# Patient Record
Sex: MALE | Race: WHITE | URBAN - METROPOLITAN AREA
[De-identification: names, ages, dates, MRNs, and addresses within clinical notes are randomized per-mention and may not be internally consistent; named-entity substitution may affect disease eponyms.]

---

## 2017-02-13 ENCOUNTER — TELEPHONE (OUTPATIENT)
Dept: NURSING | Facility: CLINIC | Age: 27
End: 2017-02-13

## 2017-02-14 NOTE — TELEPHONE ENCOUNTER
Call Type: Triage Call    Presenting Problem: Brian calling to obtain information specific to  HPV vaccination received in clinic.  Only specific information noted  related to vaccination, is  and Lot and NDC #.  Per  pharmacy, different strains are strain 4 or 9.  Able to determine  with NDC# per pharmacist.  Triage Note:  Guideline Title: Information Only Call; No Symptom Triage (Adult)  Recommended Disposition: Provide Information or Advice Only  Original Inclination:  Override Disposition:  Intended Action:  Physician Contacted: No  Follow-up call to recent contact; no triage required. Information provided from  past call documentation, approved references or experience. ?  YES  Requesting regular office appointment ? NO  Sign(s) or symptom(s) associated with a diagnosed condition or with a new illness  ? NO  Requesting information about provider, services or community resources ? NO  Call back to complete assessment/clarification of information from prior caller to  complete triage ? NO  Requesting information and provider is best resource; no triage required. ? NO  Caller not with patient and is unable to provide clinical information about  patient to facilitate triage. ? NO  Requesting provider information for recently scheduled test, procedure; no triage  required. Needed information not available per approved resources or clinical  experience. ? NO  Requesting information not available per approved reference or clinical  experience; no triage required. ? NO  Requesting information regarding scheduled exam, test or procedure; no triage  required. Information provided from approved resources or clinical experience. ?  NO  General information question; no triage required. Information provided from  approved references or knowledge of organization. ? NO  Health information question; person denies any symptoms, no triage required.  Information provided from approved references or clinical experience.  ? NO  Physician Instructions:  Care Advice:

## 2017-02-16 ENCOUNTER — TELEPHONE (OUTPATIENT)
Dept: PEDIATRICS | Facility: CLINIC | Age: 27
End: 2017-02-16

## 2017-02-16 NOTE — TELEPHONE ENCOUNTER
Jose Juan CABALLERO at 12:47 pm that pt is requesting to get his 2nd HPV there & he is 10 days early. Wants to know whether we approve early administration of 2nd HPV? If so, need to fax order for HPV vaccine because of its early. Call back # [phone].     Pt received his 1st HPV on 12/26/16. So, 2nd will be due on or after 02/26/17. Called pharmacy back & notified that he need to wait for 10 more days to receive his 2nd dose. No need to administer early.     Norma, RN  Triage Nurse

## 2017-03-03 ENCOUNTER — TELEPHONE (OUTPATIENT)
Dept: NURSING | Facility: CLINIC | Age: 27
End: 2017-03-03

## 2017-03-04 NOTE — TELEPHONE ENCOUNTER
Call Type: Triage Call    Presenting Problem: Caller is requesting the NDC# of the HPV vaccine  he received on 12/26/2016 as he is receiving  second dose  at  [facility]. Information  located in EPIC EMR and shared with  patient.  Triage Note:  Guideline Title: Information Only Call; No Symptom Triage (Adult)  Recommended Disposition: Provide Information or Advice Only  Original Inclination:  Override Disposition:  Intended Action:  Physician Contacted: No  Requesting information regarding scheduled exam, test or procedure; no triage  required. Information provided from approved resources or clinical experience. ?  YES  Requesting regular office appointment ? NO  Sign(s) or symptom(s) associated with a diagnosed condition or with a new illness  ? NO  Requesting information about provider, services or community resources ? NO  Call back to complete assessment/clarification of information from prior caller to  complete triage ? NO  Requesting information and provider is best resource; no triage required. ? NO  Caller not with patient and is unable to provide clinical information about  patient to facilitate triage. ? NO  Requesting provider information for recently scheduled test, procedure; no triage  required. Needed information not available per approved resources or clinical  experience. ? NO  Requesting information not available per approved reference or clinical  experience; no triage required. ? NO  Physician Instructions:  Care Advice:

## 2017-03-28 ENCOUNTER — MYC REFILL (OUTPATIENT)
Dept: PEDIATRICS | Facility: CLINIC | Age: 27
End: 2017-03-28

## 2017-03-28 ENCOUNTER — TELEPHONE (OUTPATIENT)
Dept: PEDIATRICS | Facility: CLINIC | Age: 27
End: 2017-03-28

## 2017-03-28 DIAGNOSIS — L70.0 COMEDONE: ICD-10-CM

## 2017-03-28 RX ORDER — TRETINOIN 0.5 MG/G
CREAM TOPICAL
Qty: 45 G | Refills: 3 | Status: SHIPPED | OUTPATIENT
Start: 2017-03-28 | End: 2017-07-20

## 2017-03-28 RX ORDER — TRETINOIN 0.5 MG/G
CREAM TOPICAL
Qty: 45 G | Refills: 11 | Status: CANCELLED | OUTPATIENT
Start: 2017-03-28

## 2017-03-28 NOTE — TELEPHONE ENCOUNTER
PA requested for tretinoin, submitted via covermymeds, await decision.    Brian Greer (Key: [id number]) - [id number]  Tretinoin 0.05% cream  Status: Sent to Plan  Created: March 28th, 2017 [phone]  Sent: March 28th, 2017      Dalila Barry MA

## 2017-03-28 NOTE — TELEPHONE ENCOUNTER
tretinoin      Last Written Prescription Date: 3/17/16  Last Fill Quantity: 45,  # refills: 11   Last Office Visit with [facility], [facility] or [facility] prescribing provider: 7/14/16    Prescription approved per [facility] Refill Protocol.  Zayra Mcintyre RN  Message handled by Nurse Triage.

## 2017-03-28 NOTE — TELEPHONE ENCOUNTER
Called and talked to pt regarding refill request of tretinoin.  Pt has not seen Dr Thomas since 9/15/2015. Last refill from Dr Thomas was 8/5/15. Pt has had refill of tretinoin with Dr Coronel 3/17/16.   Informed pt of yearly follow up with Dr Thomas for refill and pt is unable to schedule [place].   Pt states he will attempt to contact Dr Coronel for refill.  Also instructed to establish care in his new residential state for further care.   No further questions or concerns.  Reanna DENG RN  [facility]

## 2017-03-28 NOTE — TELEPHONE ENCOUNTER
Message from [employer]:  Original authorizing provider: Santiago Coronel MD, MD Alexander Greer would like a refill of the following medications:  tretinoin (RETIN-A) 0.05 % cream [Santiago Coronel MD, MD]    Preferred pharmacy: [facility] -- [address], [place]    Comment:

## 2017-03-31 NOTE — TELEPHONE ENCOUNTER
PA form was incorrect, Resubmitted today via covermymeds. Await decision again.      Brian Greer (Key: [id number]) - [id number]  Tretinoin 0.05% cream  Status: PA Request  Created: March 28th, 2017 [phone]  Sent: March 31st, 2017      Dalila Barry MA

## 2017-07-11 ENCOUNTER — OFFICE VISIT (OUTPATIENT)
Dept: PEDIATRICS | Facility: CLINIC | Age: 27
End: 2017-07-11
Payer: COMMERCIAL

## 2017-07-11 VITALS
WEIGHT: 147 LBS | HEIGHT: 71 IN | BODY MASS INDEX: 20.58 KG/M2 | HEART RATE: 74 BPM | TEMPERATURE: 98.8 F | OXYGEN SATURATION: 98 % | DIASTOLIC BLOOD PRESSURE: 60 MMHG | SYSTOLIC BLOOD PRESSURE: 100 MMHG

## 2017-07-11 DIAGNOSIS — J02.0 STREPTOCOCCAL PHARYNGITIS: Primary | ICD-10-CM

## 2017-07-11 DIAGNOSIS — Z23 NEED FOR HPV VACCINE: ICD-10-CM

## 2017-07-11 LAB
DEPRECATED S PYO AG THROAT QL EIA: ABNORMAL
MICRO REPORT STATUS: ABNORMAL
SPECIMEN SOURCE: ABNORMAL

## 2017-07-11 PROCEDURE — 87880 STREP A ASSAY W/OPTIC: CPT | Performed by: INTERNAL MEDICINE

## 2017-07-11 PROCEDURE — 90651 9VHPV VACCINE 2/3 DOSE IM: CPT | Performed by: INTERNAL MEDICINE

## 2017-07-11 PROCEDURE — 99213 OFFICE O/P EST LOW 20 MIN: CPT | Performed by: INTERNAL MEDICINE

## 2017-07-11 RX ORDER — AMOXICILLIN 500 MG/1
500 CAPSULE ORAL 2 TIMES DAILY
Qty: 20 CAPSULE | Refills: 0 | Status: SHIPPED | OUTPATIENT
Start: 2017-07-11 | End: 2017-07-21

## 2017-07-11 NOTE — PROGRESS NOTES
SUBJECTIVE:                                                    Brian Greer is a 26 year old male who presents to clinic today for the following health issues:    Acute Illness   Acute illness concerns: sore throat  Onset: 4 days    Fever: no    Chills/Sweats: YES    Headache (location?): YES    Sinus Pressure:chronic sinus congestion    Conjunctivitis:  no    Ear Pain: no    Rhinorrhea: no    Congestion: YES    Sore Throat: YES     Cough: no    Wheeze: no    Decreased Appetite: no    Nausea: no    Vomiting: no    Diarrhea:  no    Dysuria/Freq.: no    Fatigue/Achiness: YES    Sick/Strep Exposure: no     Therapies Tried and outcome:       Patient Active Problem List   Diagnosis     CARDIOVASCULAR SCREENING; LDL GOAL LESS THAN 160     Acne     Chronic rhinitis     Deviated nasal septum     Past Surgical History:   Procedure Laterality Date     C NONSPECIFIC PROCEDURE  02/00    PETs     C NONSPECIFIC PROCEDURE      intusseption age 4 and age 6       Social History   Substance Use Topics     Smoking status: Never Smoker     Smokeless tobacco: Never Used      Comment: non smoking home     Alcohol use No     Family History   Problem Relation Age of Onset     C.A.D. Maternal Grandfather      DIABETES No family hx of      Cancer - colorectal No family hx of          Current Outpatient Prescriptions   Medication Sig Dispense Refill     amoxicillin (AMOXIL) 500 MG capsule Take 1 capsule (500 mg) by mouth 2 times daily for 10 days 20 capsule 0     tretinoin (RETIN-A) 0.05 % cream Spread a pea size amount into affected area topically at bedtime.  Use sunscreen SPF>20. 45 g 3     finasteride (PROPECIA) 1 MG tablet Take 1 tablet (1 mg) by mouth daily 90 tablet 3       ROS: The following systems have been completely reviewed and are negative except as noted in the HPI: CONSTITUTIONAL, HEAD AND NECK, PULMONARY    OBJECTIVE:                                                    /60 (Cuff Size: Adult Regular)  Pulse 74   "Temp 98.8  F (37.1  C) (Oral)  Ht 5' 11\" (1.803 m)  Wt 147 lb (66.7 kg)  SpO2 98%  BMI 20.5 kg/m2 Body mass index is 20.5 kg/(m^2).  GENERAL:  alert,  no distress  HENT: ear canals- normal; TMs- normal; oropharynx- erythematous with small amounts of white exudate  NECK: no tenderness, no adenopathy  RESP: lungs clear to auscultation - no rales, no rhonchi, no wheezes  CV: regular rates and rhythm, normal S1 S2  MS: extremities- no edema     Results for orders placed or performed in visit on 07/11/17   Strep, Rapid Screen   Result Value Ref Range    Specimen Description Throat     Rapid Strep A Screen (A)      POSITIVE: Group A Streptococcal antigen detected by immunoassay.    Micro Report Status FINAL 07/11/2017         ASSESSMENT/PLAN:                                                        ICD-10-CM    1. Streptococcal pharyngitis J02.0 Strep, Rapid Screen     amoxicillin (AMOXIL) 500 MG capsule     Strep throat, 10 days amoxicillin     2. Need for HPV vaccine Z23 HUMAN PAPILLOMA VIRUS (GARDASIL 9) VACCINE           Gives additional history of recurrent sore throat while living in [place]. Typically goes to urgent care and is prescribed antibiotics m  (however strep test often negative). With ongoing nasal congestion and postnasal drip-this may account for periodic sore throat.   Recommended patient resume antihistamine combined with nasal steroid to reduce postnasal drainage --if recurrent sore throat symptoms persist instructed to follow-up with ENT and possibly an allergist for additional testing    Brian Chairez MD  [facility] [place]     "

## 2017-07-11 NOTE — MR AVS SNAPSHOT
After Visit Summary   7/11/2017    Brian Greer    MRN: [id number]           Patient Information     Date Of Birth          1990        Visit Information        Provider Department      7/11/2017 4:20 PM Brian Chairez MD [facility]        Today's Diagnoses     Streptococcal pharyngitis    -  1    Throat pain          Care Instructions    INSTRUCTIONS FOR TODAY:     amoxicillin for 10 days   push fluids   if recurrent strep infections or tonsillitis (ex 4-5 a year) would recommend ENT visit to consider tonsillectomy     after antibiotics completed, would recommend resuming nasal steroid and claritin for 3-4 weeks--suspect ongoing post nasal drip may account for intermittent sore throat    Dr Chairez            Follow-ups after your visit        Your next 10 appointments already scheduled     Jul 20, 2017  1:50 PM CDT   PHYSICAL with Santiago Coronel MD   [facility]    [address] [place] [address]  [address]  [facility] [address] [phone]              Who to contact     If you have questions or need follow up information about today's clinic visit or your schedule please contact [facility] directly at [phone].  Normal or non-critical lab and imaging results will be communicated to you by MyChart, letter or phone within 4 business days after the clinic has received the results. If you do not hear from us within 7 days, please contact the clinic through [employer] In[employer] or phone. If you have a critical or abnormal lab result, we will notify you by phone as soon as possible.  Submit refill requests through [employer] or call your pharmacy and they will forward the refill request to us. Please allow 3 business days for your refill to be completed.          Additional Information About Your Visit        MyChart Information     [employer] gives you secure access to your electronic health record. If you see a primary care provider, you  "can also send messages to your care team and make appointments. If you have questions, please call your primary care clinic.  If you do not have a primary care provider, please call [phone] and they will assist you.        Care EveryWhere ID     This is your Care EveryWhere ID. This could be used by other organizations to access your [facility] medical records  [id number]        Your Vitals Were     Pulse Temperature Height Pulse Oximetry BMI (Body Mass Index)       74 98.8  F (37.1  C) (Oral) 5' 11\" (1.803 m) 98% 20.5 kg/m2        Blood Pressure from Last 3 Encounters:   07/11/17 100/60   12/26/16 122/82   07/14/16 102/60    Weight from Last 3 Encounters:   07/11/17 147 lb (66.7 kg)   12/26/16 140 lb 3.2 oz (63.6 kg)   07/14/16 154 lb (69.9 kg)              We Performed the Following     Strep, Rapid Screen          Today's Medication Changes          These changes are accurate as of: 7/11/17  5:02 PM.  If you have any questions, ask your nurse or doctor.               Start taking these medicines.        Dose/Directions    amoxicillin 500 MG capsule   Commonly known as:  AMOXIL   Used for:  Streptococcal pharyngitis   Started by:  Brian Chairez MD        Dose:  500 mg   Take 1 capsule (500 mg) by mouth 2 times daily for 10 days   Quantity:  20 capsule   Refills:  0         Stop taking these medicines if you haven't already. Please contact your care team if you have questions.     cefdinir 300 MG capsule   Commonly known as:  OMNICEF   Stopped by:  Brian Chairez MD                Where to get your medicines      These medications were sent to [facility] - [address]   [address] [place] [address]     Phone:  [phone]     amoxicillin 500 MG capsule                Primary Care Provider Office Phone # Fax #    Brian Chairez [phone]       [facility] [address] [place] " [id number]        Equal Access to Services     : Hadii aad ku hadwilliambrooklyn Rachelali, wageraldda luqjudahha, qatyshawn michelleelvadirk lerner, tiny hernandez. So [facility] [phone].    ATENCIÓN: Si habla español, tiene a garcia disposición servicios gratuitos de asistencia lingüística. Llame al [phone].    We comply with applicable federal civil rights laws and [place] laws. We do not discriminate on the basis of race, color, national origin, age, disability sex, sexual orientation or gender identity.            Thank you!     Thank you for choosing [facility] [place]  for your care. Our goal is always to provide you with excellent care. Hearing back from our patients is one way we can continue to improve our services. Please take a few minutes to complete the written survey that you may receive in the mail after your visit with us. Thank you!             Your Updated Medication List - Protect others around you: Learn how to safely use, store and throw away your medicines at www.disposemymeds.org.          This list is accurate as of: 7/11/17  5:02 PM.  Always use your most recent med list.                   Brand Name Dispense Instructions for use Diagnosis    amoxicillin 500 MG capsule    AMOXIL    20 capsule    Take 1 capsule (500 mg) by mouth 2 times daily for 10 days    Streptococcal pharyngitis       finasteride 1 MG tablet    PROPECIA    90 tablet    Take 1 tablet (1 mg) by mouth daily    Alopecia, male pattern       tretinoin 0.05 % cream    RETIN-A    45 g    Spread a pea size amount into affected area topically at bedtime.  Use sunscreen SPF>20.    [facility]

## 2017-07-11 NOTE — NURSING NOTE
"Chief Complaint   Patient presents with     URI       Initial /60 (Cuff Size: Adult Regular)  Pulse 74  Temp 98.8  F (37.1  C) (Oral)  Ht 5' 11\" (1.803 m)  Wt 147 lb (66.7 kg)  SpO2 98%  BMI 20.5 kg/m2 Estimated body mass index is 20.5 kg/(m^2) as calculated from the following:    Height as of this encounter: 5' 11\" (1.803 m).    Weight as of this encounter: 147 lb (66.7 kg).  Medication Reconciliation: jamari Bautista CMA    "

## 2017-07-20 ENCOUNTER — OFFICE VISIT (OUTPATIENT)
Dept: PEDIATRICS | Facility: CLINIC | Age: 27
End: 2017-07-20
Payer: COMMERCIAL

## 2017-07-20 VITALS
DIASTOLIC BLOOD PRESSURE: 62 MMHG | TEMPERATURE: 98.4 F | SYSTOLIC BLOOD PRESSURE: 102 MMHG | WEIGHT: 149 LBS | HEIGHT: 71 IN | HEART RATE: 65 BPM | OXYGEN SATURATION: 99 % | BODY MASS INDEX: 20.86 KG/M2

## 2017-07-20 DIAGNOSIS — L91.8 SKIN TAG: ICD-10-CM

## 2017-07-20 DIAGNOSIS — L70.0 COMEDONE: ICD-10-CM

## 2017-07-20 DIAGNOSIS — R07.0 THROAT PAIN: ICD-10-CM

## 2017-07-20 DIAGNOSIS — Z00.00 ROUTINE GENERAL MEDICAL EXAMINATION AT A HEALTH CARE FACILITY: Primary | ICD-10-CM

## 2017-07-20 LAB
BASOPHILS # BLD AUTO: 0 10E9/L (ref 0–0.2)
BASOPHILS NFR BLD AUTO: 0.4 %
DIFFERENTIAL METHOD BLD: NORMAL
EOSINOPHIL # BLD AUTO: 0.2 10E9/L (ref 0–0.7)
EOSINOPHIL NFR BLD AUTO: 3.1 %
ERYTHROCYTE [DISTWIDTH] IN BLOOD BY AUTOMATED COUNT: 12.6 % (ref 10–15)
HCT VFR BLD AUTO: 44.7 % (ref 40–53)
HGB BLD-MCNC: 14.6 G/DL (ref 13.3–17.7)
LYMPHOCYTES # BLD AUTO: 2.3 10E9/L (ref 0.8–5.3)
LYMPHOCYTES NFR BLD AUTO: 34.7 %
MCH RBC QN AUTO: 30.5 PG (ref 26.5–33)
MCHC RBC AUTO-ENTMCNC: 32.7 G/DL (ref 31.5–36.5)
MCV RBC AUTO: 93 FL (ref 78–100)
MONOCYTES # BLD AUTO: 0.7 10E9/L (ref 0–1.3)
MONOCYTES NFR BLD AUTO: 10.4 %
NEUTROPHILS # BLD AUTO: 3.5 10E9/L (ref 1.6–8.3)
NEUTROPHILS NFR BLD AUTO: 51.4 %
PLATELET # BLD AUTO: 223 10E9/L (ref 150–450)
RBC # BLD AUTO: 4.79 10E12/L (ref 4.4–5.9)
WBC # BLD AUTO: 6.8 10E9/L (ref 4–11)

## 2017-07-20 PROCEDURE — 87491 CHLMYD TRACH DNA AMP PROBE: CPT | Performed by: INTERNAL MEDICINE

## 2017-07-20 PROCEDURE — 99395 PREV VISIT EST AGE 18-39: CPT | Mod: 25 | Performed by: INTERNAL MEDICINE

## 2017-07-20 PROCEDURE — 36415 COLL VENOUS BLD VENIPUNCTURE: CPT | Performed by: INTERNAL MEDICINE

## 2017-07-20 PROCEDURE — 86706 HEP B SURFACE ANTIBODY: CPT | Performed by: INTERNAL MEDICINE

## 2017-07-20 PROCEDURE — 87591 N.GONORRHOEAE DNA AMP PROB: CPT | Performed by: INTERNAL MEDICINE

## 2017-07-20 PROCEDURE — 87389 HIV-1 AG W/HIV-1&-2 AB AG IA: CPT | Performed by: INTERNAL MEDICINE

## 2017-07-20 PROCEDURE — 86695 HERPES SIMPLEX TYPE 1 TEST: CPT | Performed by: INTERNAL MEDICINE

## 2017-07-20 PROCEDURE — 17110 DESTRUCTION B9 LES UP TO 14: CPT | Performed by: INTERNAL MEDICINE

## 2017-07-20 PROCEDURE — 87340 HEPATITIS B SURFACE AG IA: CPT | Performed by: INTERNAL MEDICINE

## 2017-07-20 PROCEDURE — 85025 COMPLETE CBC W/AUTO DIFF WBC: CPT | Performed by: INTERNAL MEDICINE

## 2017-07-20 PROCEDURE — 86696 HERPES SIMPLEX TYPE 2 TEST: CPT | Performed by: INTERNAL MEDICINE

## 2017-07-20 PROCEDURE — 86780 TREPONEMA PALLIDUM: CPT | Performed by: INTERNAL MEDICINE

## 2017-07-20 RX ORDER — TRETINOIN 0.5 MG/G
CREAM TOPICAL
Qty: 45 G | Refills: 3 | Status: SHIPPED | OUTPATIENT
Start: 2017-07-20

## 2017-07-20 NOTE — PROGRESS NOTES
SUBJECTIVE:   CC: Brian Greer is an 27 year old male who presents for preventative health visit.     Physical   Annual:     Getting at least 3 servings of Calcium per day::  NO    Bi-annual eye exam::  NO    Dental care twice a year::  Yes    Sleep apnea or symptoms of sleep apnea::  Daytime drowsiness    Diet::  Other    Frequency of exercise::  1 day/week    Duration of exercise::  30-45 minutes    Taking medications regularly::  Yes    Medication side effects::  Other    Additional concerns today::  YES    Sore throats since moving - has been on antibiotics several times in the last several years. Has post nasal drip, feels may be making worse. Is to start claritin and flonase.     Itchy area on bottom, no bleeding, not changing in size at all. Would like removed if possible.         Today's PHQ-2 Score:   PHQ-2 ( 1999 Pfizer) 7/14/2016   Q1: Little interest or pleasure in doing things 0   Q2: Feeling down, depressed or hopeless 0   PHQ-2 Score 0       Abuse: Current or Past(Physical, Sexual or Emotional)- No  Do you feel safe in your environment - Yes    Social History   Substance Use Topics     Smoking status: Never Smoker     Smokeless tobacco: Never Used      Comment: non smoking home     Alcohol use No     The patient does not drink >3 drinks per day nor >7 drinks per week.    Last PSA: No results found for: PSA    Reviewed orders with patient. Reviewed health maintenance and updated orders accordingly - Yes  Labs reviewed in EPIC    Reviewed and updated as needed this visit by clinical staff         Reviewed and updated as needed this visit by Provider              ROS:  C: NEGATIVE for fever, chills, change in weight  I: NEGATIVE for worrisome rashes, moles or lesions  E: NEGATIVE for vision changes or irritation  ENT: NEGATIVE for ear, mouth and throat problems  R: NEGATIVE for significant cough or SOB  CV: NEGATIVE for chest pain, palpitations or peripheral edema  GI: NEGATIVE for nausea,  "abdominal pain, heartburn, or change in bowel habits   male: negative for dysuria, hematuria, decreased urinary stream, erectile dysfunction, urethral discharge  M: NEGATIVE for significant arthralgias or myalgia  N: NEGATIVE for weakness, dizziness or paresthesias  P: NEGATIVE for changes in mood or affect    OBJECTIVE:   /62 (BP Location: Right arm, Cuff Size: Adult Regular)  Pulse 65  Temp 98.4  F (36.9  C) (Oral)  Ht 5' 11\" (1.803 m)  Wt 149 lb (67.6 kg)  SpO2 99%  BMI 20.78 kg/m2    EXAM:  GENERAL: healthy, alert and no distress  EYES: Eyes grossly normal to inspection, PERRL and conjunctivae and sclerae normal  HENT: ear canals and TM's normal, nose and mouth without ulcers or lesions  NECK: no adenopathy, no asymmetry, masses, or scars and thyroid normal to palpation  RESP: lungs clear to auscultation - no rales, rhonchi or wheezes  CV: regular rate and rhythm, normal S1 S2, no S3 or S4, no murmur, click or rub, no peripheral edema and peripheral pulses strong  ABDOMEN: soft, nontender, no hepatosplenomegaly, no masses and bowel sounds normal  MS: no gross musculoskeletal defects noted, no edema  SKIN: no suspicious lesions or rashes  SKIN: noted 0.25 cm area pedunculated on left mid buttock area near the gluteal cleft  NEURO: Normal strength and tone, mentation intact and speech normal  PSYCH: mentation appears normal, affect normal/bright  LYMPH: no cervical, supraclavicular, axillary, or inguinal adenopathy  : normal external genitalia, normal testicles without masses    Procedure: verbal consent obtained, pedunculated skin tag treated with 4 cycles of liquid nitrogen cryotherapy, patient tolerated well without complicatons    ASSESSMENT/PLAN:   1. Routine general medical examination at a health care facility  Discussed routine health screening, interested in starting PrEP therapy. Will get baseline labs for this.  - HIV Antigen Antibody Combo  - Anti Treponema  - Herpes Simplex Virus 1 and " "2 IgG  - NEISSERIA GONORRHOEA PCR  - CHLAMYDIA TRACHOMATIS PCR  - NEISSERIA GONORRHOEA PCR  - CHLAMYDIA TRACHOMATIS PCR  - NEISSERIA GONORRHOEA PCR  - CHLAMYDIA TRACHOMATIS PCR  - Hepatitis B Surface Antibody  - Hepatitis B surface antigen    2. Comedone  Will continue current therapy  - tretinoin (RETIN-A) 0.05 % cream; Spread a pea size amount into affected area topically at bedtime.  Use sunscreen SPF>20.  Dispense: 45 g; Refill: 3    3. Skin tag  Treated with liquid nitrogen, given options of shave removal versus cryotherapy,   - DESTRUCT BENIGN LESION, UP TO 14    4. Throat pain  Will check white count, agree with claritin and flonase, STI testing as ntoed above  - CBC with platelets differential    COUNSELING:   Reviewed preventive health counseling, as reflected in patient instructions         reports that he has never smoked. He has never used smokeless tobacco.      Estimated body mass index is 20.5 kg/(m^2) as calculated from the following:    Height as of 7/11/17: 5' 11\" (1.803 m).    Weight as of 7/11/17: 147 lb (66.7 kg).         Counseling Resources:  ATP IV Guidelines  Pooled Cohorts Equation Calculator  FRAX Risk Assessment  ICSI Preventive Guidelines  Dietary Guidelines for Americans, 2010  USDA's MyPlate  ASA Prophylaxis  Lung CA Screening    Santiago Coronel MD, MD  [facility] [place]  "

## 2017-07-20 NOTE — MR AVS SNAPSHOT
After Visit Summary   7/20/2017    Brian Greer    MRN: [id number]           Patient Information     Date Of Birth          1990        Visit Information        Provider Department      7/20/2017 1:50 PM Santiago Coronel MD [facility]        Today's Diagnoses     Routine general medical examination at a health care facility    -  1    [place]        Skin tag        Throat pain          Care Instructions    1) Skin tag treated with liquid nitrogen, should come off in 4-5 days, can use bandaid with topical antibiotic if needed    2) Routine screening labs as we discussed- blood and urine downstairs.     3) I will let you know lab results when I get them back    4) Refilled the topical acne medication    Santiago Coronel MD    Preventive Health Recommendations  Male Ages 26 - 39    Yearly exam:             See your health care provider every year in order to  o   Review health changes.   o   Discuss preventive care.    o   Review your medicines if your doctor has prescribed any.    You should be tested each year for STDs (sexually transmitted diseases), if you re at risk.     After age 35, talk to your provider about cholesterol testing. If you are at risk for heart disease, have your cholesterol tested at least every 5 years.     If you are at risk for diabetes, you should have a diabetes test (fasting glucose).  Shots: Get a flu shot each year. Get a tetanus shot every 10 years.     Nutrition:    Eat at least 5 servings of fruits and vegetables daily.     Eat whole-grain bread, whole-wheat pasta and brown rice instead of white grains and rice.     Talk to your provider about Calcium and Vitamin D.     Lifestyle    Exercise for at least 150 minutes a week (30 minutes a day, 5 days a week). This will help you control your weight and prevent disease.     Limit alcohol to one drink per day.     No smoking.     Wear sunscreen to prevent skin cancer.     See your dentist every six months  "for an exam and cleaning.             Follow-ups after your visit        Who to contact     If you have questions or need follow up information about today's clinic visit or your schedule please contact [facility] directly at [phone].  Normal or non-critical lab and imaging results will be communicated to you by MyChart, letter or phone within 4 business days after the clinic has received the results. If you do not hear from us within 7 days, please contact the clinic through [employer] or phone. If you have a critical or abnormal lab result, we will notify you by phone as soon as possible.  Submit refill requests through [employer] or call your pharmacy and they will forward the refill request to us. Please allow 3 business days for your refill to be completed.          Additional Information About Your Visit        [employer] Information     [employer] gives you secure access to your electronic health record. If you see a primary care provider, you can also send messages to your care team and make appointments. If you have questions, please call your primary care clinic.  If you do not have a primary care provider, please call [phone] and they will assist you.        Care EveryWhere ID     This is your Care EveryWhere ID. This could be used by other organizations to access your [facility] medical records  [id number]        Your Vitals Were     Pulse Temperature Height Pulse Oximetry BMI (Body Mass Index)       65 98.4  F (36.9  C) (Oral) 5' 11\" (1.803 m) 99% 20.78 kg/m2        Blood Pressure from Last 3 Encounters:   07/20/17 102/62   07/11/17 100/60   12/26/16 122/82    Weight from Last 3 Encounters:   07/20/17 149 lb (67.6 kg)   07/11/17 147 lb (66.7 kg)   12/26/16 140 lb 3.2 oz (63.6 kg)              We Performed the Following     Anti Treponema     CBC with platelets differential     CHLAMYDIA TRACHOMATIS PCR     CHLAMYDIA TRACHOMATIS PCR     CHLAMYDIA TRACHOMATIS PCR     Hepatitis B Surface Antibody "     Hepatitis B surface antigen     Herpes Simplex Virus 1 and 2 IgG     HIV Antigen Antibody Combo     NEISSERIA GONORRHOEA PCR     NEISSERIA GONORRHOEA PCR     NEISSERIA GONORRHOEA PCR          Where to get your medicines      These medications were sent to [facility] [place] [facility] [place] - [address]   [address], [place] [address]     Phone:  [phone]     tretinoin 0.05 % cream          Primary Care Provider Office Phone # Fax #    Brian Chairez [phone]       [facility] [address] [place] [address]        Equal Access to Services     [facility]: Hadii aad ku hadasho Soomaali, waaxda luqadaha, qaybta kaalmada adeegyada, waxay idiin hayaan rupert fernando . So [facility] [phone].    ATENCIÓN: Si habla español, tiene a garcia disposición servicios gratuitos de asistencia lingüística. [facility] [phone].    We comply with applicable federal civil rights laws and [place] laws. We do not discriminate on the basis of race, color, national origin, age, disability sex, sexual orientation or gender identity.            Thank you!     Thank you for choosing [facility]  for your care. Our goal is always to provide you with excellent care. Hearing back from our patients is one way we can continue to improve our services. Please take a few minutes to complete the written survey that you may receive in the mail after your visit with us. Thank you!             Your Updated Medication List - Protect others around you: Learn how to safely use, store and throw away your medicines at www.disposemymeds.org.          This list is accurate as of: 7/20/17  2:38 PM.  Always use your most recent med list.                   Brand Name Dispense Instructions for use Diagnosis    amoxicillin 500 MG capsule    AMOXIL    20 capsule    Take 1 capsule (500 mg) by mouth 2 times daily for 10 days    Streptococcal pharyngitis        CLARITIN PO           finasteride 1 MG tablet    PROPECIA    90 tablet    Take 1 tablet (1 mg) by mouth daily    Alopecia, male pattern       FLONASE NA           tretinoin 0.05 % cream    RETIN-A    45 g    Spread a pea size amount into affected area topically at bedtime.  Use sunscreen SPF>20.    [place]

## 2017-07-20 NOTE — NURSING NOTE
"Chief Complaint   Patient presents with     Physical       Initial /62 (BP Location: Right arm, Cuff Size: Adult Regular)  Pulse 65  Temp 98.4  F (36.9  C) (Oral)  Ht 5' 11\" (1.803 m)  Wt 149 lb (67.6 kg)  SpO2 99%  BMI 20.78 kg/m2 Estimated body mass index is 20.78 kg/(m^2) as calculated from the following:    Height as of this encounter: 5' 11\" (1.803 m).    Weight as of this encounter: 149 lb (67.6 kg).  Medication Reconciliation: complete   Dalila Barry MA    "

## 2017-07-20 NOTE — PATIENT INSTRUCTIONS
1) Skin tag treated with liquid nitrogen, should come off in 4-5 days, can use bandaid with topical antibiotic if needed    2) Routine screening labs as we discussed- blood and urine downstairs.     3) I will let you know lab results when I get them back    4) Refilled the topical acne medication    Santiago Coronel MD    Preventive Health Recommendations  Male Ages 26 - 39    Yearly exam:             See your health care provider every year in order to  o   Review health changes.   o   Discuss preventive care.    o   Review your medicines if your doctor has prescribed any.    You should be tested each year for STDs (sexually transmitted diseases), if you re at risk.     After age 35, talk to your provider about cholesterol testing. If you are at risk for heart disease, have your cholesterol tested at least every 5 years.     If you are at risk for diabetes, you should have a diabetes test (fasting glucose).  Shots: Get a flu shot each year. Get a tetanus shot every 10 years.     Nutrition:    Eat at least 5 servings of fruits and vegetables daily.     Eat whole-grain bread, whole-wheat pasta and brown rice instead of white grains and rice.     Talk to your provider about Calcium and Vitamin D.     Lifestyle    Exercise for at least 150 minutes a week (30 minutes a day, 5 days a week). This will help you control your weight and prevent disease.     Limit alcohol to one drink per day.     No smoking.     Wear sunscreen to prevent skin cancer.     See your dentist every six months for an exam and cleaning.

## 2017-07-21 LAB
C TRACH DNA SPEC QL NAA+PROBE: NORMAL
HBV SURFACE AB SERPL IA-ACNC: ABNORMAL M[IU]/ML
HBV SURFACE AG SERPL QL IA: NONREACTIVE
HIV 1+2 AB+HIV1 P24 AG SERPL QL IA: NORMAL
HSV1 IGG SERPL QL IA: NORMAL AI (ref 0–0.8)
HSV2 IGG SERPL QL IA: NORMAL AI (ref 0–0.8)
N GONORRHOEA DNA SPEC QL NAA+PROBE: NORMAL
SPECIMEN SOURCE: NORMAL
SPECIMEN SOURCE: NORMAL
T PALLIDUM IGG+IGM SER QL: NEGATIVE

## 2017-07-23 LAB
C TRACH DNA SPEC QL NAA+PROBE: NORMAL
C TRACH DNA SPEC QL NAA+PROBE: NORMAL
N GONORRHOEA DNA SPEC QL NAA+PROBE: NORMAL
N GONORRHOEA DNA SPEC QL NAA+PROBE: NORMAL
SPECIMEN SOURCE: NORMAL

## 2017-11-26 DIAGNOSIS — L64.9 ALOPECIA, MALE PATTERN: ICD-10-CM

## 2017-11-29 RX ORDER — FINASTERIDE 1 MG/1
1 TABLET, FILM COATED ORAL DAILY
Qty: 90 TABLET | Refills: 1 | Status: SHIPPED | OUTPATIENT
Start: 2017-11-29 | End: 2018-07-02

## 2017-11-29 NOTE — TELEPHONE ENCOUNTER
Requested Prescriptions   Pending Prescriptions Disp Refills     finasteride (PROPECIA) 1 MG tablet 90 tablet 3     Sig: Take 1 tablet (1 mg) by mouth daily    Miscellaneous Dermatologic Agents Failed    11/26/2017  7:00 PM       Failed - Refill request is not for Imiquimod, 5-Fluorouracil, or Finasteride     If Imiquimod, 5-Fluorouracil, or Finasteride, may refill if indicated in progress notes.          Passed - Recent or future visit with authorizing provider's specialty    Patient had office visit in the last year or has a visit in the next 30 days with authorizing provider.  See chart review.          Passed - Patient is 24 mos old or older        Prescription approved per [facility] Refill Protocol.   Tory Peterson, RN  Triage Nurse

## 2018-07-02 DIAGNOSIS — L64.9 ALOPECIA, MALE PATTERN: ICD-10-CM

## 2018-07-02 NOTE — TELEPHONE ENCOUNTER
"Requested Prescriptions   Pending Prescriptions Disp Refills     finasteride (PROPECIA) 1 MG tablet [Pharmacy Med Name: FINASTERIDE 1MG TABLETS]    Last Written Prescription Date:  11/29/2017  Last Fill Quantity: 90,  # refills: 1   Last office visit: 7/20/2017 with prescribing provider:  Brian Chairez     Future Office Visit:     90 tablet 0     Sig: TAKE 1 TABLET(1 MG) BY MOUTH DAILY    Miscellaneous Dermatologic Agents Failed    7/2/2018  5:46 AM       Failed - Refill request is not for Imiquimod, 5-Fluorouracil, or Finasteride     If Imiquimod, 5-Fluorouracil, or Finasteride, may refill if indicated in progress notes.          Passed - Recent (12 mo) or future (30 days) visit within the authorizing provider's specialty    Patient had office visit in the last 12 months or has a visit in the next 30 days with authorizing provider or within the authorizing provider's specialty.  See \"Patient Info\" tab in inbasket, or \"Choose Columns\" in Meds & Orders section of the refill encounter.           Passed - Patient is 24 mos old or older          "

## 2018-07-05 RX ORDER — FINASTERIDE 1 MG/1
TABLET, FILM COATED ORAL
Qty: 30 TABLET | Refills: 0 | Status: SHIPPED | OUTPATIENT
Start: 2018-07-05 | End: 2018-08-01

## 2018-07-05 NOTE — TELEPHONE ENCOUNTER
Patient needs to be seen because Last visit for alopecia 11/2/15   Last OV: 7/20/17  Medication is being filled for 1 time refill only due to see above Patient needs to be seen because see above.  [employer] message sent.  Greg Noonan, RN

## 2018-08-01 DIAGNOSIS — L64.9 ALOPECIA, MALE PATTERN: ICD-10-CM

## 2018-08-01 RX ORDER — FINASTERIDE 1 MG/1
TABLET, FILM COATED ORAL
Qty: 30 TABLET | Refills: 0 | Status: SHIPPED | OUTPATIENT
Start: 2018-08-01 | End: 2018-09-02

## 2018-08-01 NOTE — TELEPHONE ENCOUNTER
"Requested Prescriptions   Pending Prescriptions Disp Refills     finasteride (PROPECIA) 1 MG tablet [Pharmacy Med Name: FINASTERIDE 1MG TABLETS]    Last Written Prescription Date:  7/5/2018  Last Fill Quantity: 30,  # refills: 0   Last office visit: 7/20/2017 with prescribing provider:  Brian Chairez     Future Office Visit:     30 tablet 0     Sig: TAKE 1 TABLET(1 MG) BY MOUTH DAILY    Miscellaneous Dermatologic Agents Failed    8/1/2018  5:46 AM       Failed - Recent (12 mo) or future (30 days) visit within the authorizing provider's specialty    Patient had office visit in the last 12 months or has a visit in the next 30 days with authorizing provider or within the authorizing provider's specialty.  See \"Patient Info\" tab in inbasket, or \"Choose Columns\" in Meds & Orders section of the refill encounter.           Failed - Refill request is not for Imiquimod, 5-Fluorouracil, or Finasteride     If Imiquimod, 5-Fluorouracil, or Finasteride, may refill if indicated in progress notes.          Passed - Patient is 24 mos old or older          "

## 2018-09-02 DIAGNOSIS — L64.9 ALOPECIA, MALE PATTERN: ICD-10-CM

## 2018-09-02 NOTE — TELEPHONE ENCOUNTER
"Requested Prescriptions   Pending Prescriptions Disp Refills     finasteride (PROPECIA) 1 MG tablet [Pharmacy Med Name: FINASTERIDE 1MG TABLETS]  Last Written Prescription Date:  8/1/18  Last Fill Quantity: 30,  # refills: 0   Last office visit: 7/20/2017 with prescribing provider:  7/20/17   Future Office Visit:     30 tablet 0     Sig: TAKE 1 TABLET BY MOUTH EVERY DAY    Miscellaneous Dermatologic Agents Failed    9/2/2018  5:46 AM       Failed - Recent (12 mo) or future (30 days) visit within the authorizing provider's specialty    Patient had office visit in the last 12 months or has a visit in the next 30 days with authorizing provider or within the authorizing provider's specialty.  See \"Patient Info\" tab in inbasket, or \"Choose Columns\" in Meds & Orders section of the refill encounter.           Failed - Refill request is not for Imiquimod, 5-Fluorouracil, or Finasteride     If Imiquimod, 5-Fluorouracil, or Finasteride, may refill if indicated in progress notes.          Passed - Patient is 24 mos old or older          "

## 2018-09-03 RX ORDER — FINASTERIDE 1 MG/1
1 TABLET, FILM COATED ORAL DAILY
Qty: 90 TABLET | Refills: 3 | Status: SHIPPED | OUTPATIENT
Start: 2018-09-03 | End: 2019-09-06

## 2019-06-20 ENCOUNTER — TELEPHONE (OUTPATIENT)
Dept: FAMILY MEDICINE | Facility: CLINIC | Age: 29
End: 2019-06-20

## 2019-06-20 NOTE — TELEPHONE ENCOUNTER
Reason for Call:  Other call back    Detailed comments: Patient calling, states that he saw Dr. Hinkle several years ago and was recommended a cleanser for enlarged pores. Patient states it is no longer being produced and he would like alternatives, specifically requesting ones for enlarged pores.  Old was called Aquaglycolic, now what would you recommend.?  Because they don't make that anymore.    Phone Number Patient can be reached at: Cell number on file:    Telephone Information:   Mobile [phone]       Best Time: anytime    Can we leave a detailed message on this number? YES    Call taken on 6/20/2019 at 4:24 PM by Nel MITCHELL

## 2019-06-21 NOTE — TELEPHONE ENCOUNTER
Patient notified of providers message, patient has no further questions.    Kori LOZADARN BSN  [facility]  [phone]      The [facility] name is [facility].     Thank you,    SANCHEZ

## 2019-09-06 DIAGNOSIS — L64.9 ALOPECIA, MALE PATTERN: ICD-10-CM

## 2019-09-06 NOTE — TELEPHONE ENCOUNTER
"Requested Prescriptions   Pending Prescriptions Disp Refills     finasteride (PROPECIA) 1 MG tablet [Pharmacy Med Name: FINASTERIDE 1MG TABLETS]  Last Written Prescription Date:  09/03/2018  Last Fill Quantity: 90 tablet,  # refills: 3   Last Office Visit: 7/20/2017 Santiago Coronel MD   Future Office Visit:      90 tablet 0     Sig: TAKE 1 TABLET(1 MG) BY MOUTH DAILY       Miscellaneous Dermatologic Agents Failed - 9/6/2019  1:08 PM        Failed - Recent (12 mo) or future (30 days) visit within the authorizing provider's specialty     Patient had office visit in the last 12 months or has a visit in the next 30 days with authorizing provider or within the authorizing provider's specialty.  See \"Patient Info\" tab in inbasket, or \"Choose Columns\" in Meds & Orders section of the refill encounter.              Failed - Refill request is not for Imiquimod, 5-Fluorouracil, or Finasteride      If Imiquimod, 5-Fluorouracil, or Finasteride, may refill if indicated in progress notes.           Passed - Medication is active on med list        Passed - Patient is 24 mos old or older          "

## 2019-09-09 RX ORDER — FINASTERIDE 1 MG/1
TABLET, FILM COATED ORAL
Qty: 90 TABLET | Refills: 0 | Status: SHIPPED | OUTPATIENT
Start: 2019-09-09

## 2019-09-09 NOTE — TELEPHONE ENCOUNTER
Routing refill request to provider for review/approval because:  Patient needs to be seen because it has been more than 2 years since last office visit.  Called pharmacy and they received an Rx from Dr Chairez last year for a year supply.    Jen Kyle RN, [facility]

## 2019-12-07 DIAGNOSIS — L64.9 ALOPECIA, MALE PATTERN: ICD-10-CM

## 2019-12-08 ENCOUNTER — HEALTH MAINTENANCE LETTER (OUTPATIENT)
Age: 29
End: 2019-12-08

## 2019-12-08 NOTE — TELEPHONE ENCOUNTER
"Requested Prescriptions   Pending Prescriptions Disp Refills     finasteride (PROPECIA) 1 MG tablet [Pharmacy Med Name: FINASTERIDE 1MG TABLETS]    Last Written Prescription Date:  9/9/19  Last Fill Quantity: 90 tablet,  # refills: 0   Last office visit: 7/20/2017 with prescribing provider:  Berna     Future Office Visit:     90 tablet 0     Sig: TAKE 1 TABLET BY MOUTH EVERY DAY       Miscellaneous Dermatologic Agents Failed - 12/7/2019  5:45 AM        Failed - Recent (12 mo) or future (30 days) visit within the authorizing provider's specialty     Patient has had an office visit with the authorizing provider or a provider within the authorizing providers department within the previous 12 mos or has a future within next 30 days. See \"Patient Info\" tab in inbasket, or \"Choose Columns\" in Meds & Orders section of the refill encounter.              Failed - Refill request is not for Imiquimod, 5-Fluorouracil, or Finasteride      If Imiquimod, 5-Fluorouracil, or Finasteride, may refill if indicated in progress notes.           Passed - Medication is active on med list        Passed - Patient is 24 mos old or older        "

## 2019-12-09 NOTE — TELEPHONE ENCOUNTER
patient has not been seen since 2017- routing to team to contact for appointment or to see where to send this script request

## 2019-12-12 RX ORDER — FINASTERIDE 1 MG/1
TABLET, FILM COATED ORAL
Qty: 90 TABLET | Refills: 0 | COMMUNITY
Start: 2019-12-12

## 2019-12-12 NOTE — TELEPHONE ENCOUNTER
Spoke with VVII Knox at [facility] who states it is well documented that he needs appointment for refills past several refill requests, he has moved to [place], advised we are unable to refill, needs to get established with provider in [place] for refills, patient understands and will establish care in [place], pharmacy notified as well.

## 2021-01-10 ENCOUNTER — HEALTH MAINTENANCE LETTER (OUTPATIENT)
Age: 31
End: 2021-01-10

## 2021-04-07 ENCOUNTER — TELEPHONE (OUTPATIENT)
Dept: FAMILY MEDICINE | Facility: CLINIC | Age: 31
End: 2021-04-07

## 2021-04-07 NOTE — TELEPHONE ENCOUNTER
Called patient- has not been seen since 2017- advised he needs appointment to discuss with a provider.    Kori VALLERN BSN  [facility]  [phone]

## 2021-04-07 NOTE — TELEPHONE ENCOUNTER
Patient Returning Call    Reason for call:  PT     Information from  patient:    Pt  Has had a  and he is not able to get it anymore - P is asking if they is a other one that yo would recommend  for him to use,      Patient has additional questions:  No      Okay to leave a detailed message?: Yes at Other phone number:  [phone]

## 2021-10-23 ENCOUNTER — HEALTH MAINTENANCE LETTER (OUTPATIENT)
Age: 31
End: 2021-10-23

## 2022-02-12 ENCOUNTER — HEALTH MAINTENANCE LETTER (OUTPATIENT)
Age: 32
End: 2022-02-12

## 2022-10-06 NOTE — PATIENT INSTRUCTIONS
1.  Stable  2.  Continue Trintellix and propanolol as prescribed  3.  Call with new or worsening concerns   INSTRUCTIONS FOR TODAY:     amoxicillin for 10 days   push fluids   if recurrent strep infections or tonsillitis (ex 4-5 a year) would recommend ENT visit to consider tonsillectomy     after antibiotics completed, would recommend resuming nasal steroid and claritin for 3-4 weeks--suspect ongoing post nasal drip may account for intermittent sore throat    Dr Chairez

## 2022-10-10 ENCOUNTER — HEALTH MAINTENANCE LETTER (OUTPATIENT)
Age: 32
End: 2022-10-10

## 2023-02-18 ENCOUNTER — HEALTH MAINTENANCE LETTER (OUTPATIENT)
Age: 33
End: 2023-02-18

## 2024-03-16 ENCOUNTER — HEALTH MAINTENANCE LETTER (OUTPATIENT)
Age: 34
End: 2024-03-16